# Patient Record
Sex: MALE | Race: WHITE | Employment: FULL TIME | URBAN - NONMETROPOLITAN AREA
[De-identification: names, ages, dates, MRNs, and addresses within clinical notes are randomized per-mention and may not be internally consistent; named-entity substitution may affect disease eponyms.]

---

## 2020-06-01 ENCOUNTER — HOSPITAL ENCOUNTER (EMERGENCY)
Age: 30
Discharge: HOME | End: 2020-06-01
Attending: EMERGENCY MEDICINE
Payer: COMMERCIAL

## 2020-06-01 ENCOUNTER — APPOINTMENT (OUTPATIENT)
Dept: GENERAL RADIOLOGY | Age: 30
End: 2020-06-01
Payer: COMMERCIAL

## 2020-06-01 VITALS
BODY MASS INDEX: 25.67 KG/M2 | OXYGEN SATURATION: 100 % | HEIGHT: 74 IN | DIASTOLIC BLOOD PRESSURE: 67 MMHG | RESPIRATION RATE: 14 BRPM | WEIGHT: 200 LBS | TEMPERATURE: 97.6 F | HEART RATE: 60 BPM | SYSTOLIC BLOOD PRESSURE: 122 MMHG

## 2020-06-01 PROCEDURE — 90715 TDAP VACCINE 7 YRS/> IM: CPT | Performed by: EMERGENCY MEDICINE

## 2020-06-01 PROCEDURE — 12002 RPR S/N/AX/GEN/TRNK2.6-7.5CM: CPT

## 2020-06-01 PROCEDURE — 99283 EMERGENCY DEPT VISIT LOW MDM: CPT

## 2020-06-01 PROCEDURE — 73140 X-RAY EXAM OF FINGER(S): CPT

## 2020-06-01 PROCEDURE — 11730 AVULSION NAIL PLATE SIMPLE 1: CPT

## 2020-06-01 PROCEDURE — 6370000000 HC RX 637 (ALT 250 FOR IP): Performed by: PHYSICIAN ASSISTANT

## 2020-06-01 PROCEDURE — 6370000000 HC RX 637 (ALT 250 FOR IP): Performed by: EMERGENCY MEDICINE

## 2020-06-01 PROCEDURE — 90471 IMMUNIZATION ADMIN: CPT | Performed by: EMERGENCY MEDICINE

## 2020-06-01 PROCEDURE — 6360000002 HC RX W HCPCS: Performed by: EMERGENCY MEDICINE

## 2020-06-01 RX ORDER — CEPHALEXIN 500 MG/1
500 CAPSULE ORAL ONCE
Status: COMPLETED | OUTPATIENT
Start: 2020-06-01 | End: 2020-06-01

## 2020-06-01 RX ORDER — CEPHALEXIN 500 MG/1
500 CAPSULE ORAL 4 TIMES DAILY
Qty: 40 CAPSULE | Refills: 0 | Status: SHIPPED | OUTPATIENT
Start: 2020-06-01 | End: 2020-06-11

## 2020-06-01 RX ORDER — ALBUTEROL SULFATE 90 UG/1
2 AEROSOL, METERED RESPIRATORY (INHALATION) EVERY 6 HOURS PRN
COMMUNITY

## 2020-06-01 RX ORDER — HYDROCODONE BITARTRATE AND ACETAMINOPHEN 5; 325 MG/1; MG/1
1 TABLET ORAL ONCE
Status: COMPLETED | OUTPATIENT
Start: 2020-06-01 | End: 2020-06-01

## 2020-06-01 RX ORDER — NICOTINE POLACRILEX 4 MG/1
20 GUM, CHEWING ORAL DAILY
COMMUNITY

## 2020-06-01 RX ORDER — IBUPROFEN 600 MG/1
600 TABLET ORAL ONCE
Status: COMPLETED | OUTPATIENT
Start: 2020-06-01 | End: 2020-06-01

## 2020-06-01 RX ORDER — HYDROCODONE BITARTRATE AND ACETAMINOPHEN 5; 325 MG/1; MG/1
1 TABLET ORAL EVERY 6 HOURS PRN
Qty: 8 TABLET | Refills: 0 | Status: SHIPPED | OUTPATIENT
Start: 2020-06-01 | End: 2020-06-03

## 2020-06-01 RX ORDER — IBUPROFEN 800 MG/1
800 TABLET ORAL EVERY 8 HOURS PRN
Qty: 20 TABLET | Refills: 0 | Status: SHIPPED | OUTPATIENT
Start: 2020-06-01

## 2020-06-01 RX ADMIN — TETANUS TOXOID, REDUCED DIPHTHERIA TOXOID AND ACELLULAR PERTUSSIS VACCINE, ADSORBED 0.5 ML: 5; 2.5; 8; 8; 2.5 SUSPENSION INTRAMUSCULAR at 19:56

## 2020-06-01 RX ADMIN — CEPHALEXIN 500 MG: 500 CAPSULE ORAL at 19:56

## 2020-06-01 RX ADMIN — HYDROCODONE BITARTRATE AND ACETAMINOPHEN 1 TABLET: 5; 325 TABLET ORAL at 21:07

## 2020-06-01 RX ADMIN — IBUPROFEN 600 MG: 600 TABLET, FILM COATED ORAL at 21:07

## 2020-06-01 ASSESSMENT — PAIN DESCRIPTION - ONSET: ONSET: SUDDEN

## 2020-06-01 ASSESSMENT — PAIN DESCRIPTION - DESCRIPTORS: DESCRIPTORS: THROBBING

## 2020-06-01 ASSESSMENT — PAIN SCALES - GENERAL
PAINLEVEL_OUTOF10: 10
PAINLEVEL_OUTOF10: 2
PAINLEVEL_OUTOF10: 8

## 2020-06-01 ASSESSMENT — PAIN DESCRIPTION - LOCATION: LOCATION: FINGER (COMMENT WHICH ONE)

## 2020-06-01 ASSESSMENT — PAIN DESCRIPTION - ORIENTATION: ORIENTATION: RIGHT

## 2020-06-01 ASSESSMENT — PAIN DESCRIPTION - FREQUENCY: FREQUENCY: CONTINUOUS

## 2020-06-01 ASSESSMENT — PAIN DESCRIPTION - PAIN TYPE: TYPE: ACUTE PAIN

## 2020-06-01 NOTE — ED PROVIDER NOTES
Emergency Department Provider Note  Location: [address] EMERGENCY DEPARTMENT  6/1/2020     Patient Identification  Antonella Banegas is a 30 y.o. male    Chief Complaint  Hand Injury (pt states he dropped a weight bench on his R thumb, bleeding, laceration, crush injury )          HPI  (History provided by patient)  Patient is a generally healthy 25-year-old male who presents for evaluation of her right thumb crush injury that occurred while he was moving free weights. Reports his thumb got caught between 2 bar. Across the distal phalanx. There is clear laceration through the nail. Denies any other injuries. Not on blood thinners or anticoagulants. Unclear when his last tetanus was. I have reviewed the following nursing documentation:  Allergies: No Known Allergies    Past medical history:  has a past medical history of Asthma. Past surgical history:  has no past surgical history on file. Home medications:   Prior to Admission medications    Medication Sig Start Date End Date Taking? Authorizing Provider   omeprazole 20 MG EC tablet Take 20 mg by mouth daily   Yes Historical Provider, MD   albuterol sulfate HFA (VENTOLIN HFA) 108 (90 Base) MCG/ACT inhaler Inhale 2 puffs into the lungs every 6 hours as needed for Wheezing   Yes Historical Provider, MD   cephALEXin (KEFLEX) 500 MG capsule Take 1 capsule by mouth 4 times daily for 10 days 6/1/20 6/11/20 Yes Bri Astudillo PA-C   HYDROcodone-acetaminophen (NORCO) 5-325 MG per tablet Take 1 tablet by mouth every 6 hours as needed for Pain for up to 2 days. 6/1/20 6/3/20 Yes Bri Astudillo PA-C   ibuprofen (IBU) 800 MG tablet Take 1 tablet by mouth every 8 hours as needed for Pain 6/1/20  Yes Bri Astudillo PA-C       Social history:  reports that he has never smoked. He has never used smokeless tobacco. He reports current alcohol use. He reports that he does not use drugs. Family history:  History reviewed.  No pertinent family Mental Status: He is alert and oriented to person, place, and time. Motor: No abnormal muscle tone. Coordination: Coordination normal.   Psychiatric:         Mood and Affect: Mood normal.         Behavior: Behavior normal.             ED Course    ED Medication Orders (From admission, onward)    Start Ordered     Status Ordering Provider    06/01/20 2100 06/01/20 2043  HYDROcodone-acetaminophen (NORCO) 5-325 MG per tablet 1 tablet  ONCE      Last MAR action:  Given - by Lissa Dies on 06/01/20 at [address], [address]    06/01/20 2100 06/01/20 2043  ibuprofen (ADVIL;MOTRIN) tablet 600 mg  ONCE      Last MAR action:  Given - by Lissa Dies on 06/01/20 at [address], [address]    06/01/20 2015 06/01/20 1951  cephALEXin (KEFLEX) capsule 500 mg  ONCE      Last MAR action:  Given - by Lissa Dies on 06/01/20 at [address], [address] [address]    06/01/20 2000 06/01/20 1943  Tetanus-Diphth-Acell Pertussis (BOOSTRIX) injection 0.5 mL  ONCE      Last MAR action:  Given - by Lissa Dies on 06/01/20 at [address], [address] [address]              Radiology  Xr Finger Right (min 2 Views)    Result Date: 6/1/2020  EXAMINATION: THREE XRAY VIEWS OF THE RIGHT FINGERS 6/1/2020 8:11 pm COMPARISON: None. HISTORY: ORDERING SYSTEM PROVIDED HISTORY: R thumb, crush injury TECHNOLOGIST PROVIDED HISTORY: Reason for exam:->R thumb, crush injury Reason for Exam: Hand Injury (pt states he dropped a weight bench on his R thumb, bleeding, laceration, crush injury ) Acuity: Acute Type of Exam: Initial FINDINGS: Soft tissue swelling and defect of distal aspect of the right thumb is. There is a defect of the base of the nail, probably involving its bed. There is suspected fracture of the distal aspect of the tuft of the right thumb. Soft tissue swelling and defect of the distal aspect of the right thumb. Small fracture of the distal aspect of the tuft of the distal phalanx of the right thumb suspected.

## 2020-06-01 NOTE — ED NOTES
Pt to ER via self, pt states he dropped a weight bench on his R thumb, bleeding, laceration, crush injury. Pt writhing in pain, R  Thumb pink and warm, swollen, bleeding controlled. Pt alert and oriented and without distress, call light within reach, denies needs at this time.      Dorinda Henry RN  06/01/20 2013

## 2020-06-02 ASSESSMENT — ENCOUNTER SYMPTOMS
PHOTOPHOBIA: 0
ABDOMINAL PAIN: 0
NAUSEA: 0
DIARRHEA: 0
BACK PAIN: 0
SHORTNESS OF BREATH: 0
VOMITING: 0
RHINORRHEA: 0
WHEEZING: 0
COUGH: 0

## 2020-06-02 NOTE — ED NOTES
Pt DC home in good condition with RX x__3__ with follow up with [facility]. V/u of Dc instructions. Denies questions or concerns. Teaching done re: s/s to report.      Mica Davis RN  06/01/20 4774

## 2020-06-02 NOTE — ED PROVIDER NOTES
immediate complications  COMPLETE NAIL WAS REMOVED. WOUND CLEANED AND IRRIGATED. NAILBED REPAIRED WITH VICRYL SUTURES. WOUND DRESSING AND SPLINT WILL BE APPLIED. Tetanus updated. Placed on keflex. Referred to orthopedic hand specialist for follow up. Advised returning to ER for any worsening or signs of infection. I estimate there is LOW risk for COMPARTMENT SYNDROME, TENDON OR NEUROVASCULAR INJURY, thus I consider the discharge disposition reasonable.            Bri Astudillo PA-C  06/01/20 7982

## 2020-06-02 NOTE — ED NOTES
RN rounded on pt. Pt medicated with tetanus shot and with abx and updated on POC. Pt has no further needs at this time. Pt resting in bed, call light within reach. Pt denies any questions.       Kim Mitchell RN  06/01/20 2010

## 2020-06-02 NOTE — ED NOTES
RN rounded on pt. Pt medicated for pain, updated on POC, vss and as charted. Pt has no further needs at this time. Pt resting in bed, call light within reach. Pt denies any questions.       Margot Carlson RN  06/01/20 0704

## 2020-06-08 ENCOUNTER — OFFICE VISIT (OUTPATIENT)
Dept: ORTHOPEDIC SURGERY | Age: 30
End: 2020-06-08
Payer: COMMERCIAL

## 2020-06-08 VITALS — HEIGHT: 74 IN | BODY MASS INDEX: 26.05 KG/M2 | WEIGHT: 203 LBS

## 2020-06-08 PROBLEM — S61.111A: Status: ACTIVE | Noted: 2020-06-08

## 2020-06-08 PROBLEM — S62.521A: Status: ACTIVE | Noted: 2020-06-08

## 2020-06-08 PROCEDURE — G8419 CALC BMI OUT NRM PARAM NOF/U: HCPCS | Performed by: ORTHOPAEDIC SURGERY

## 2020-06-08 PROCEDURE — G8427 DOCREV CUR MEDS BY ELIG CLIN: HCPCS | Performed by: ORTHOPAEDIC SURGERY

## 2020-06-08 PROCEDURE — 99202 OFFICE O/P NEW SF 15 MIN: CPT | Performed by: ORTHOPAEDIC SURGERY

## 2020-06-08 PROCEDURE — 1036F TOBACCO NON-USER: CPT | Performed by: ORTHOPAEDIC SURGERY

## 2020-06-08 NOTE — PROGRESS NOTES
file     Inability: Not on file    Transportation needs     Medical: Not on file     Non-medical: Not on file   Tobacco Use    Smoking status: Never Smoker    Smokeless tobacco: Never Used   Substance and Sexual Activity    Alcohol use: Yes    Drug use: No    Sexual activity: Yes     Partners: Female   Lifestyle    Physical activity     Days per week: Not on file     Minutes per session: Not on file    Stress: Not on file   Relationships    Social connections     Talks on phone: Not on file     Gets together: Not on file     Attends Rastafari service: Not on file     Active member of club or organization: Not on file     Attends meetings of clubs or organizations: Not on file     Relationship status: Not on file    Intimate partner violence     Fear of current or ex partner: Not on file     Emotionally abused: Not on file     Physically abused: Not on file     Forced sexual activity: Not on file   Other Topics Concern    Not on file   Social History Narrative    Not on file     No family history on file. Patient's medications, allergies, past medical, surgical, social and family histories were reviewed and updated as appropriate. Review of Systems  Pertinent items are noted in HPI  Denies fever chills, confusion and bowel and bladder active change  Complete Review of Systems reviewed from patient history form dated 6/8/20 and available in the patients chart under the media tab. Vital Signs  Vitals:    06/08/20 1402   Weight: 203 lb (92.1 kg)   Height: 6' 1.5\" (1.867 m)     Body mass index is 26.42 kg/m².      Physical Exam  Constitutional: Normal nutritional status  Mental Status: Alert and oriented  Skin: No rashes or erythema  Lymphatic: No lymphadenopathy    Right Hand Examination:  Inspection: He has completely lost the thumbnail on the right thumb there is an area of dried blood on the ulnar aspect so it is difficult to determine exactly the viability of his whole nail bed  Finger Range of Motion: Full  Wrist Range of Motion: Normal  Vascular Exam: Normal capillary refill  Neurologic Exam: No numbness or tingling  Intrinsic Muscle Strength: Normal  Extrinsic Muscle Strength: Normal  Special Tests:        Additional Comments:     Additional Examinations:  X-Ray Findings: PA lateral and oblique x-rays of the right thumb were obtained at the emergency room and on 1 June and I reviewed these there is a small fragment tuft injury. On these radiographs the nail was still present above the eponychial fold  Additional Diagnostic Test Findings:    Office Procedures: Was cleaned and redressed today          This dictation was performed with a verbal recognition program. It is possible that there are still dictated errors within this office note. All efforts were made to ensure that this office note is accurate. No orders of the defined types were placed in this encounter. Attestation: I have reviewed the chief complaint and history of present illness (including ROS and PFSH) and vital documentation by my staff and I agree with their documentation and have added where applicable.

## 2020-06-09 ENCOUNTER — TELEPHONE (OUTPATIENT)
Dept: ORTHOPEDIC SURGERY | Age: 30
End: 2020-06-09

## 2021-01-08 ENCOUNTER — HOSPITAL ENCOUNTER (EMERGENCY)
Age: 31
Discharge: HOME | End: 2021-01-08
Attending: EMERGENCY MEDICINE
Payer: COMMERCIAL

## 2021-01-08 VITALS
RESPIRATION RATE: 18 BRPM | TEMPERATURE: 98 F | OXYGEN SATURATION: 99 % | HEIGHT: 74 IN | DIASTOLIC BLOOD PRESSURE: 85 MMHG | HEART RATE: 91 BPM | BODY MASS INDEX: 25.67 KG/M2 | WEIGHT: 200 LBS | SYSTOLIC BLOOD PRESSURE: 140 MMHG

## 2021-01-08 DIAGNOSIS — J02.0 STREP PHARYNGITIS: Primary | ICD-10-CM

## 2021-01-08 LAB — S PYO AG THROAT QL: NEGATIVE

## 2021-01-08 PROCEDURE — 87081 CULTURE SCREEN ONLY: CPT

## 2021-01-08 PROCEDURE — 99283 EMERGENCY DEPT VISIT LOW MDM: CPT

## 2021-01-08 PROCEDURE — 87880 STREP A ASSAY W/OPTIC: CPT

## 2021-01-08 RX ORDER — AMOXICILLIN 875 MG/1
875 TABLET, COATED ORAL 2 TIMES DAILY
Qty: 20 TABLET | Refills: 0 | Status: SHIPPED | OUTPATIENT
Start: 2021-01-08 | End: 2021-01-18

## 2021-01-08 ASSESSMENT — ENCOUNTER SYMPTOMS
ABDOMINAL PAIN: 0
VOMITING: 0
SORE THROAT: 1
NAUSEA: 0
COUGH: 0
SHORTNESS OF BREATH: 0
DIARRHEA: 1
RHINORRHEA: 0
BACK PAIN: 0

## 2021-01-08 ASSESSMENT — PAIN SCALES - GENERAL: PAINLEVEL_OUTOF10: 5

## 2021-01-08 NOTE — ED PROVIDER NOTES
[facility]        Pt Name: Hannah Tse  MRN: [id number]  [place] 1990  Date of evaluation: 1/8/2021  Provider: Isidra Farris MD  PCP: WINNIE Waite - CNP  ED Attending: MD Vesta Carroll       Chief Complaint   Patient presents with    Pharyngitis      Pt arrives for eval of sore throat onset 2 days ago. Pt sts febrile yesterday- afebrile in triage with no antiemetic since 0200 am. Pt sts wife has strep throat at this time. HISTORY OF PRESENT ILLNESS   (Location/Symptom, Timing/Onset, Context/Setting, Quality, Duration, Modifying Factors, Severity)  Note limiting factors. Hannah Tse is a 35 y.o. male who presents with two to three days of sore throat. Feels scratchy and hurts when he takes a drink. Patient apparently has had a fever intermittently for the last couple of days along with body aches. He denies any runny nose, cough, shortness of breath. Patient states his wife earlier this week tested positive for strep and he believes he has gotten it. Patient took a couple of tablets of amoxicillin yesterday prior to coming in today. History is obtained from the patient. REVIEW OF SYSTEMS    (2-9 systems for level 4, 10 or more for level 5)     Review of Systems   Constitutional: Negative for chills and fever. HENT: Positive for sore throat. Negative for congestion and rhinorrhea. Respiratory: Negative for cough and shortness of breath. Cardiovascular: Negative for chest pain. Gastrointestinal: Positive for diarrhea. Negative for abdominal pain, nausea and vomiting. Musculoskeletal: Negative for back pain and neck pain. Neurological: Negative for weakness. Positives and Pertinent negatives as per HPI. Except as noted above in the ROS, all other systems were reviewed and negative.        PAST MEDICAL HISTORY     Past Medical History:   Diagnosis Date    Asthma SURGICAL HISTORY   No past surgical history on file. [address]       Discharge Medication List as of 1/8/2021 12:15 PM      CONTINUE these medications which have NOT CHANGED    Details   omeprazole 20 MG EC tablet Take 20 mg by mouth dailyHistorical Med      albuterol sulfate HFA (VENTOLIN HFA) 108 (90 Base) MCG/ACT inhaler Inhale 2 puffs into the lungs every 6 hours as needed for WheezingHistorical Med      ibuprofen (IBU) 800 MG tablet Take 1 tablet by mouth every 8 hours as needed for Pain, Disp-20 tablet, R-0Print               ALLERGIES     Patient has no known allergies. FAMILYHISTORY     No family history on file. SOCIAL HISTORY       Social History     Socioeconomic History    Marital status:      Spouse name: Not on file    Number of children: Not on file    Years of education: Not on file    Highest education level: Not on file   Occupational History    Not on file   Social Needs    Financial resource strain: Not on file    Food insecurity     Worry: Not on file     Inability: Not on file    Transportation needs     Medical: Not on file     Non-medical: Not on file   Tobacco Use    Smoking status: Never Smoker    Smokeless tobacco: Never Used   Substance and Sexual Activity    Alcohol use:  Yes    Drug use: No    Sexual activity: Yes     Partners: Female   Lifestyle    Physical activity     Days per week: Not on file     Minutes per session: Not on file    Stress: Not on file   Relationships    Social connections     Talks on phone: Not on file     Gets together: Not on file     Attends Scientologist service: Not on file     Active member of club or organization: Not on file     Attends meetings of clubs or organizations: Not on file     Relationship status: Not on file    Intimate partner violence     Fear of current or ex partner: Not on file     Emotionally abused: Not on file     Physically abused: Not on file     Forced sexual activity: Not on file refill takes less than 2 seconds. Findings: No rash. Neurological:      Mental Status: He is alert and oriented to person, place, and time. DIAGNOSTIC RESULTS   LABS:    Results for orders placed or performed during the hospital encounter of 01/08/21   Strep screen group a throat    Specimen: Throat   Result Value Ref Range    Rapid Strep A Screen Negative Negative       All other labs were within normal range ornot returned as of this dictation. EKG: All EKG's are interpreted by the Emergency Department Physician who either signs or Co-signs this chart in the absence of a cardiologist.  Please see their note for interpretation of EKG. RADIOLOGY:   Non-plain film images such as CT, Ultrasound and MRI are read by the radiologist.  Plain radiographic images are visualized and preliminarily interpreted by the ED Provider with the belowfindings:    Interpretation per the Radiologist below, if available at the time of this note:    No orders to display         PROCEDURES   Unless otherwise noted below, none     Procedures    CRITICAL CARE TIME   N/A    CONSULTS:  None      EMERGENCY DEPARTMENT COURSE and DIFFERENTIAL DIAGNOSIS/MDM:   Vitals:    Vitals:    01/08/21 1132   BP: (!) 140/85   Pulse: 91   Resp: 18   Temp: 98 °F (36.7 °C)   TempSrc: Oral   SpO2: 99%   Weight: 200 lb (90.7 kg)   Height: 6' 2\" (1.88 m)       Patient was given the following medications:  Medications - No data to display    Patient's rapid strep is likely going to be negative given his recent use of amoxicillin. Clinically he appears to have strep pharyngitis, especially given the history of his wifes infection. I am going to formally put him on amoxicillin. I want him to follow up with his PCP sometime this week. Patient is agreeable with this plan.     I estimate there is LOW risk for EPIGLOTTITIS, GONOCOCCAL PHARYNGITIS, RPA, PTA, LUDWIGS ANGINA, PNEUMONIA, MENINGITIS, OR URINARY TRACT INFECTION, thus I consider the discharge disposition reasonable. Also, there is no evidence or peritonitis, sepsis, or toxicity. Lorna Girard and I have discussed the diagnosis and risks, and we agree with discharging home to follow-up with their primary doctor. We also discussed returning to the Emergency Department immediately if new or worsening symptoms occur. We have discussed the symptoms which are most concerning (e.g., changing or worsening pain, trouble swallowing or breating, neck stiffness, fever) that necessitate immediate return. Discharge Vital Signs:  Blood pressure (!) 140/85, pulse 91, temperature 98 °F (36.7 °C), temperature source Oral, resp. rate 18, height 6' 2\" (1.88 m), weight 200 lb (90.7 kg), SpO2 99 %. The patient understands the importance of follow up and reasons to return. FINAL IMPRESSION      1. Strep pharyngitis          DISPOSITION/PLAN   DISPOSITION Decision To Discharge 01/08/2021 12:13:35 PM      PATIENT REFERRED TO:  WINNIE Licona - CNP    Schedule an appointment as soon as possible for a visit in 1 week      [address] Emergency Department  Kurtis Rivera [address]  [phone]  Go to   If symptoms worsen      DISCHARGE MEDICATIONS:  Discharge Medication List as of 1/8/2021 12:15 PM      START taking these medications    Details   amoxicillin (AMOXIL) 875 MG tablet Take 1 tablet by mouth 2 times daily for 10 days, Disp-20 tablet, R-0Normal             DISCONTINUED MEDICATIONS:  Discharge Medication List as of 1/8/2021 12:15 PM                 (Please note that portions of this note were completed with a voice recognition program.  Efforts were made to edit the dictations but occasionally words are mis-transcribed.)    Mateo Burr MD(electronically signed)              Mateo Burr MD  01/08/21 4520

## 2021-01-10 LAB — S PYO THROAT QL CULT: NORMAL

## 2023-02-08 ENCOUNTER — INITIAL CONSULT (OUTPATIENT)
Dept: SURGERY | Age: 33
End: 2023-02-08

## 2023-02-08 VITALS — WEIGHT: 200 LBS | HEIGHT: 74 IN | BODY MASS INDEX: 25.67 KG/M2

## 2023-02-08 DIAGNOSIS — K43.6 INCARCERATED VENTRAL HERNIA: Primary | ICD-10-CM

## 2023-02-08 DIAGNOSIS — R10.13 EPIGASTRIC PAIN: ICD-10-CM

## 2023-02-15 ENCOUNTER — TELEPHONE (OUTPATIENT)
Dept: SURGERY | Age: 33
End: 2023-02-15

## 2023-02-15 ENCOUNTER — HOSPITAL ENCOUNTER (OUTPATIENT)
Dept: ULTRASOUND IMAGING | Age: 33
Discharge: HOME | End: 2023-02-15
Payer: COMMERCIAL

## 2023-02-15 ENCOUNTER — HOSPITAL ENCOUNTER (OUTPATIENT)
Dept: GENERAL RADIOLOGY | Age: 33
Discharge: HOME | End: 2023-02-15
Payer: COMMERCIAL

## 2023-02-15 DIAGNOSIS — R10.13 EPIGASTRIC PAIN: ICD-10-CM

## 2023-02-15 PROCEDURE — 74240 X-RAY XM UPR GI TRC 1CNTRST: CPT

## 2023-02-15 PROCEDURE — 76705 ECHO EXAM OF ABDOMEN: CPT

## 2023-02-15 NOTE — TELEPHONE ENCOUNTER
Pt called stating he had his testing done this morning, wasn't sure if he was to schedule another office appt or wait for results for surgery scheduling.

## 2023-02-15 NOTE — TELEPHONE ENCOUNTER
Tell him UGI and GBUS were normal.  Ok to proceed with fixing ventral hernia. We can set that up or he can come back in to the office to discuss further. If he wants to proceed then lap ventral hernia repair with mesh. 45 minutes. General. Dx:  incarcerated ventral hernia.

## 2023-02-16 NOTE — TELEPHONE ENCOUNTER
I called patient and informed him. He states he wants this to go under [facility], but we do not have that info on file. He states his PCP referred him to our office, so he will contact them.

## 2023-02-20 NOTE — PROGRESS NOTES
[place]          HPI:  Patient is 33y.o. year old male seen at request of Dejan Olmos. He   reports bulge and pain in umbilical area. He also has pain in RUQ. It is pressure-like and sharp. It is described as moderate. Other associated symptoms are bloating/abdominal distension and nausea. These symptoms have been present for  weeks . The pain seems to have started with an unknown event. The pain does radiate to the back. He   admits to seeing a bulge at the umbilicus. Past Medical History:   Diagnosis Date    Asthma        No past surgical history on file. Current Outpatient Medications on File Prior to Visit   Medication Sig Dispense Refill    albuterol sulfate HFA (PROVENTIL;VENTOLIN;PROAIR) 108 (90 Base) MCG/ACT inhaler Inhale 2 puffs into the lungs every 6 hours as needed for Wheezing Indications: PRN      omeprazole 20 MG EC tablet Take 20 mg by mouth daily (Patient not taking: Reported on 2/8/2023)      ibuprofen (IBU) 800 MG tablet Take 1 tablet by mouth every 8 hours as needed for Pain (Patient not taking: Reported on 2/8/2023) 20 tablet 0     No current facility-administered medications on file prior to visit.        No Known Allergies    Social History     Socioeconomic History    Marital status:      Spouse name: Not on file    Number of children: Not on file    Years of education: Not on file    Highest education level: Not on file   Occupational History    Not on file   Tobacco Use    Smoking status: Never    Smokeless tobacco: Never   Vaping Use    Vaping Use: Never used   Substance and Sexual Activity    Alcohol use: Not Currently    Drug use: No    Sexual activity: Yes     Partners: Female   Other Topics Concern    Not on file   Social History Narrative    Not on file     Social Determinants of Health     Financial Resource Strain: Not on file   Food Insecurity: Not on file   Transportation Needs: Not on file   Physical Activity: Not on file   Stress: Not on file   Social Connections: Not on file   Intimate Partner Violence: Not on file   Housing Stability: Not on file       No family history on file. ROS: He reports no complaints related to the eyes, ears , nose throat or mouth. He denies weight loss. No chest pain. No SOB. No urinary complaints. No musculoskeletal complaints. No skin rashes. No neurologic deficits. No bleeding tendencies. GI complaints include periumbilical pain and bulge as well as RUQ pain. Physical Exam:    General:  Comfortable. No distress. Eyes:  No scleral icterus  Ears:  Normal  Nose:  Normal  Mouth:  Mucous membranes moist  Respiratory: Lungs CTA. No accessory muscle use. Heart:  Regular rhythm  Abdomen:  Soft. Non distended. Mild tenderness RUQ. Ventral hernia present. Musculoskeletal:  No abnormal movements. ROM extremities normal.  Skin:  No rashes. Neurologic:  No focal deficits. Psychiatric:  AAA. O x 3.    Radiographic studies:  None        ASSESSMENT:  1. Incarcerated ventral hernia    2. Epigastric pain            PLAN:  UGI and GBUS to further evaluate the epigastric and RUQ pain. Ventral hernia repair will be recommended. Will need gallbladder workup completed prior to considering ventral hernia surgery.       Freddie Adler MD

## 2023-02-23 ENCOUNTER — TELEPHONE (OUTPATIENT)
Dept: SURGERY | Age: 33
End: 2023-02-23

## 2023-03-08 ENCOUNTER — TELEPHONE (OUTPATIENT)
Dept: SURGERY | Age: 33
End: 2023-03-08

## 2023-03-09 DIAGNOSIS — K43.6 INCARCERATED VENTRAL HERNIA: ICD-10-CM

## 2023-03-09 NOTE — TELEPHONE ENCOUNTER
I called patient and he scheduled surgery for 3/20/23. He was given all presurgery instructions, no aspirin, blood thinners 5 days preop, NPO after midnight prior, He must have someone derive him home. He was given directions to [facility] to sign in.

## 2023-03-17 ENCOUNTER — ANESTHESIA EVENT (OUTPATIENT)
Dept: OPERATING ROOM | Age: 33
End: 2023-03-17
Payer: COMMERCIAL

## 2023-03-20 ENCOUNTER — ANESTHESIA (OUTPATIENT)
Dept: OPERATING ROOM | Age: 33
End: 2023-03-20
Payer: COMMERCIAL

## 2023-03-20 ENCOUNTER — HOSPITAL ENCOUNTER (OUTPATIENT)
Age: 33
Setting detail: OUTPATIENT SURGERY
Discharge: HOME | End: 2023-03-20
Attending: SURGERY | Admitting: SURGERY
Payer: COMMERCIAL

## 2023-03-20 VITALS
TEMPERATURE: 97.7 F | WEIGHT: 199 LBS | OXYGEN SATURATION: 96 % | SYSTOLIC BLOOD PRESSURE: 123 MMHG | BODY MASS INDEX: 25.54 KG/M2 | RESPIRATION RATE: 16 BRPM | HEART RATE: 68 BPM | HEIGHT: 74 IN | DIASTOLIC BLOOD PRESSURE: 62 MMHG

## 2023-03-20 DIAGNOSIS — K43.6 INCARCERATED VENTRAL HERNIA: Primary | ICD-10-CM

## 2023-03-20 PROCEDURE — 7100000001 HC PACU RECOVERY - ADDTL 15 MIN: Performed by: SURGERY

## 2023-03-20 PROCEDURE — 2500000003 HC RX 250 WO HCPCS: Performed by: SURGERY

## 2023-03-20 PROCEDURE — 6360000002 HC RX W HCPCS: Performed by: ANESTHESIOLOGY

## 2023-03-20 PROCEDURE — 2709999900 HC NON-CHARGEABLE SUPPLY: Performed by: SURGERY

## 2023-03-20 PROCEDURE — 3600000014 HC SURGERY LEVEL 4 ADDTL 15MIN: Performed by: SURGERY

## 2023-03-20 PROCEDURE — 2500000003 HC RX 250 WO HCPCS: Performed by: ANESTHESIOLOGY

## 2023-03-20 PROCEDURE — 6360000002 HC RX W HCPCS: Performed by: NURSE ANESTHETIST, CERTIFIED REGISTERED

## 2023-03-20 PROCEDURE — 2500000003 HC RX 250 WO HCPCS: Performed by: NURSE ANESTHETIST, CERTIFIED REGISTERED

## 2023-03-20 PROCEDURE — 7100000010 HC PHASE II RECOVERY - FIRST 15 MIN: Performed by: SURGERY

## 2023-03-20 PROCEDURE — 49592 RPR AA HRN 1ST < 3 NCR/STRN: CPT | Performed by: SURGERY

## 2023-03-20 PROCEDURE — C1713 ANCHOR/SCREW BN/BN,TIS/BN: HCPCS | Performed by: SURGERY

## 2023-03-20 PROCEDURE — 7100000011 HC PHASE II RECOVERY - ADDTL 15 MIN: Performed by: SURGERY

## 2023-03-20 PROCEDURE — 7100000000 HC PACU RECOVERY - FIRST 15 MIN: Performed by: SURGERY

## 2023-03-20 PROCEDURE — 6360000002 HC RX W HCPCS

## 2023-03-20 PROCEDURE — 3700000000 HC ANESTHESIA ATTENDED CARE: Performed by: SURGERY

## 2023-03-20 PROCEDURE — 2580000003 HC RX 258: Performed by: SURGERY

## 2023-03-20 PROCEDURE — C1781 MESH (IMPLANTABLE): HCPCS | Performed by: SURGERY

## 2023-03-20 PROCEDURE — 6360000002 HC RX W HCPCS: Performed by: SURGERY

## 2023-03-20 PROCEDURE — 3700000001 HC ADD 15 MINUTES (ANESTHESIA): Performed by: SURGERY

## 2023-03-20 PROCEDURE — 3600000004 HC SURGERY LEVEL 4 BASE: Performed by: SURGERY

## 2023-03-20 PROCEDURE — 2580000003 HC RX 258: Performed by: ANESTHESIOLOGY

## 2023-03-20 PROCEDURE — 6370000000 HC RX 637 (ALT 250 FOR IP): Performed by: ANESTHESIOLOGY

## 2023-03-20 DEVICE — MESH HERN DIA4.5IN CIR W/ ECHO PS POS SYS VENTRALIGHT ST: Type: IMPLANTABLE DEVICE | Site: ABDOMEN | Status: FUNCTIONAL

## 2023-03-20 DEVICE — SYSTEM PERM FIX L37CM 15 FAST CAPSUR: Type: IMPLANTABLE DEVICE | Site: ABDOMEN | Status: FUNCTIONAL

## 2023-03-20 DEVICE — DEVICE FIX L37CM PEEK SMOOTH CANN 30 ABSRB FAST FOR LAP: Type: IMPLANTABLE DEVICE | Site: ABDOMEN | Status: FUNCTIONAL

## 2023-03-20 RX ORDER — FENTANYL CITRATE 50 UG/ML
INJECTION, SOLUTION INTRAMUSCULAR; INTRAVENOUS PRN
Status: DISCONTINUED | OUTPATIENT
Start: 2023-03-20 | End: 2023-03-20 | Stop reason: SDUPTHER

## 2023-03-20 RX ORDER — ONDANSETRON 2 MG/ML
INJECTION INTRAMUSCULAR; INTRAVENOUS PRN
Status: DISCONTINUED | OUTPATIENT
Start: 2023-03-20 | End: 2023-03-20 | Stop reason: SDUPTHER

## 2023-03-20 RX ORDER — MEPERIDINE HYDROCHLORIDE 25 MG/ML
12.5 INJECTION INTRAMUSCULAR; INTRAVENOUS; SUBCUTANEOUS EVERY 5 MIN PRN
Status: DISCONTINUED | OUTPATIENT
Start: 2023-03-20 | End: 2023-03-20 | Stop reason: HOSPADM

## 2023-03-20 RX ORDER — KETOROLAC TROMETHAMINE 30 MG/ML
30 INJECTION, SOLUTION INTRAMUSCULAR; INTRAVENOUS ONCE
Status: DISCONTINUED | OUTPATIENT
Start: 2023-03-20 | End: 2023-03-20 | Stop reason: HOSPADM

## 2023-03-20 RX ORDER — SODIUM CHLORIDE, SODIUM LACTATE, POTASSIUM CHLORIDE, CALCIUM CHLORIDE 600; 310; 30; 20 MG/100ML; MG/100ML; MG/100ML; MG/100ML
INJECTION, SOLUTION INTRAVENOUS CONTINUOUS
Status: DISCONTINUED | OUTPATIENT
Start: 2023-03-20 | End: 2023-03-20 | Stop reason: HOSPADM

## 2023-03-20 RX ORDER — OXYCODONE HYDROCHLORIDE 5 MG/1
10 TABLET ORAL PRN
Status: COMPLETED | OUTPATIENT
Start: 2023-03-20 | End: 2023-03-20

## 2023-03-20 RX ORDER — ONDANSETRON 2 MG/ML
4 INJECTION INTRAMUSCULAR; INTRAVENOUS
Status: DISCONTINUED | OUTPATIENT
Start: 2023-03-20 | End: 2023-03-20 | Stop reason: HOSPADM

## 2023-03-20 RX ORDER — HEPARIN SODIUM 5000 [USP'U]/ML
5000 INJECTION, SOLUTION INTRAVENOUS; SUBCUTANEOUS ONCE
Status: DISCONTINUED | OUTPATIENT
Start: 2023-03-20 | End: 2023-03-20 | Stop reason: HOSPADM

## 2023-03-20 RX ORDER — SODIUM CHLORIDE 0.9 % (FLUSH) 0.9 %
5-40 SYRINGE (ML) INJECTION EVERY 12 HOURS SCHEDULED
Status: DISCONTINUED | OUTPATIENT
Start: 2023-03-20 | End: 2023-03-20 | Stop reason: HOSPADM

## 2023-03-20 RX ORDER — FAMOTIDINE 10 MG/ML
20 INJECTION, SOLUTION INTRAVENOUS ONCE
Status: COMPLETED | OUTPATIENT
Start: 2023-03-20 | End: 2023-03-20

## 2023-03-20 RX ORDER — DEXAMETHASONE SODIUM PHOSPHATE 4 MG/ML
INJECTION, SOLUTION INTRA-ARTICULAR; INTRALESIONAL; INTRAMUSCULAR; INTRAVENOUS; SOFT TISSUE PRN
Status: DISCONTINUED | OUTPATIENT
Start: 2023-03-20 | End: 2023-03-20 | Stop reason: SDUPTHER

## 2023-03-20 RX ORDER — ROCURONIUM BROMIDE 10 MG/ML
INJECTION, SOLUTION INTRAVENOUS PRN
Status: DISCONTINUED | OUTPATIENT
Start: 2023-03-20 | End: 2023-03-20 | Stop reason: SDUPTHER

## 2023-03-20 RX ORDER — SODIUM CHLORIDE 9 MG/ML
25 INJECTION, SOLUTION INTRAVENOUS PRN
Status: DISCONTINUED | OUTPATIENT
Start: 2023-03-20 | End: 2023-03-20 | Stop reason: HOSPADM

## 2023-03-20 RX ORDER — SODIUM CHLORIDE 0.9 % (FLUSH) 0.9 %
5-40 SYRINGE (ML) INJECTION PRN
Status: DISCONTINUED | OUTPATIENT
Start: 2023-03-20 | End: 2023-03-20 | Stop reason: HOSPADM

## 2023-03-20 RX ORDER — LIDOCAINE HYDROCHLORIDE 10 MG/ML
0.3 INJECTION, SOLUTION EPIDURAL; INFILTRATION; INTRACAUDAL; PERINEURAL
Status: DISCONTINUED | OUTPATIENT
Start: 2023-03-20 | End: 2023-03-20 | Stop reason: HOSPADM

## 2023-03-20 RX ORDER — CEFAZOLIN 2 G/1
INJECTION, POWDER, FOR SOLUTION INTRAMUSCULAR; INTRAVENOUS
Status: DISCONTINUED
Start: 2023-03-20 | End: 2023-03-20 | Stop reason: HOSPADM

## 2023-03-20 RX ORDER — HEPARIN SODIUM 5000 [USP'U]/ML
INJECTION, SOLUTION INTRAVENOUS; SUBCUTANEOUS
Status: COMPLETED
Start: 2023-03-20 | End: 2023-03-20

## 2023-03-20 RX ORDER — PROPOFOL 10 MG/ML
INJECTION, EMULSION INTRAVENOUS PRN
Status: DISCONTINUED | OUTPATIENT
Start: 2023-03-20 | End: 2023-03-20 | Stop reason: SDUPTHER

## 2023-03-20 RX ORDER — OXYCODONE HYDROCHLORIDE 5 MG/1
5 TABLET ORAL EVERY 6 HOURS PRN
Qty: 24 TABLET | Refills: 0 | Status: SHIPPED | OUTPATIENT
Start: 2023-03-20 | End: 2023-03-27

## 2023-03-20 RX ORDER — SODIUM CHLORIDE 9 MG/ML
INJECTION, SOLUTION INTRAVENOUS PRN
Status: DISCONTINUED | OUTPATIENT
Start: 2023-03-20 | End: 2023-03-20 | Stop reason: HOSPADM

## 2023-03-20 RX ORDER — KETOROLAC TROMETHAMINE 30 MG/ML
INJECTION, SOLUTION INTRAMUSCULAR; INTRAVENOUS
Status: COMPLETED
Start: 2023-03-20 | End: 2023-03-20

## 2023-03-20 RX ORDER — BUPIVACAINE HYDROCHLORIDE 5 MG/ML
INJECTION, SOLUTION EPIDURAL; INTRACAUDAL PRN
Status: DISCONTINUED | OUTPATIENT
Start: 2023-03-20 | End: 2023-03-20 | Stop reason: ALTCHOICE

## 2023-03-20 RX ORDER — OXYCODONE HYDROCHLORIDE 5 MG/1
5 TABLET ORAL PRN
Status: COMPLETED | OUTPATIENT
Start: 2023-03-20 | End: 2023-03-20

## 2023-03-20 RX ADMIN — HEPARIN SODIUM 5000 UNITS: 5000 INJECTION, SOLUTION INTRAVENOUS; SUBCUTANEOUS at 08:11

## 2023-03-20 RX ADMIN — HYDROMORPHONE HYDROCHLORIDE 0.5 MG: 1 INJECTION, SOLUTION INTRAMUSCULAR; INTRAVENOUS; SUBCUTANEOUS at 09:42

## 2023-03-20 RX ADMIN — ROCURONIUM BROMIDE 50 MG: 10 INJECTION, SOLUTION INTRAVENOUS at 08:36

## 2023-03-20 RX ADMIN — OXYCODONE 10 MG: 5 TABLET ORAL at 10:11

## 2023-03-20 RX ADMIN — HYDROMORPHONE HYDROCHLORIDE 0.5 MG: 1 INJECTION, SOLUTION INTRAMUSCULAR; INTRAVENOUS; SUBCUTANEOUS at 09:53

## 2023-03-20 RX ADMIN — SODIUM CHLORIDE, POTASSIUM CHLORIDE, SODIUM LACTATE AND CALCIUM CHLORIDE: 600; 310; 30; 20 INJECTION, SOLUTION INTRAVENOUS at 08:15

## 2023-03-20 RX ADMIN — FAMOTIDINE 20 MG: 10 INJECTION, SOLUTION INTRAVENOUS at 08:16

## 2023-03-20 RX ADMIN — CEFAZOLIN 2000 MG: 2 INJECTION, POWDER, FOR SOLUTION INTRAMUSCULAR; INTRAVENOUS at 08:30

## 2023-03-20 RX ADMIN — SUGAMMADEX 200 MG: 100 INJECTION, SOLUTION INTRAVENOUS at 09:09

## 2023-03-20 RX ADMIN — ONDANSETRON HYDROCHLORIDE 4 MG: 2 INJECTION, SOLUTION INTRAMUSCULAR; INTRAVENOUS at 08:47

## 2023-03-20 RX ADMIN — FENTANYL CITRATE 100 MCG: 50 INJECTION INTRAMUSCULAR; INTRAVENOUS at 08:36

## 2023-03-20 RX ADMIN — HYDROMORPHONE HYDROCHLORIDE 0.5 MG: 1 INJECTION, SOLUTION INTRAMUSCULAR; INTRAVENOUS; SUBCUTANEOUS at 09:31

## 2023-03-20 RX ADMIN — PROPOFOL 200 MG: 10 INJECTION, EMULSION INTRAVENOUS at 08:36

## 2023-03-20 RX ADMIN — DEXAMETHASONE SODIUM PHOSPHATE 4 MG: 4 INJECTION, SOLUTION INTRAMUSCULAR; INTRAVENOUS at 08:47

## 2023-03-20 RX ADMIN — FENTANYL CITRATE 50 MCG: 50 INJECTION INTRAMUSCULAR; INTRAVENOUS at 08:46

## 2023-03-20 RX ADMIN — HYDROMORPHONE HYDROCHLORIDE 0.5 MG: 1 INJECTION, SOLUTION INTRAMUSCULAR; INTRAVENOUS; SUBCUTANEOUS at 09:36

## 2023-03-20 RX ADMIN — SODIUM CHLORIDE, POTASSIUM CHLORIDE, SODIUM LACTATE AND CALCIUM CHLORIDE: 600; 310; 30; 20 INJECTION, SOLUTION INTRAVENOUS at 08:18

## 2023-03-20 RX ADMIN — FENTANYL CITRATE 50 MCG: 50 INJECTION INTRAMUSCULAR; INTRAVENOUS at 09:09

## 2023-03-20 RX ADMIN — FENTANYL CITRATE 50 MCG: 50 INJECTION INTRAMUSCULAR; INTRAVENOUS at 09:07

## 2023-03-20 RX ADMIN — KETOROLAC TROMETHAMINE 30 MG: 30 INJECTION, SOLUTION INTRAMUSCULAR; INTRAVENOUS at 09:54

## 2023-03-20 ASSESSMENT — PAIN SCALES - GENERAL
PAINLEVEL_OUTOF10: 0
PAINLEVEL_OUTOF10: 10
PAINLEVEL_OUTOF10: 9
PAINLEVEL_OUTOF10: 8
PAINLEVEL_OUTOF10: 10
PAINLEVEL_OUTOF10: 9
PAINLEVEL_OUTOF10: 0
PAINLEVEL_OUTOF10: 8
PAINLEVEL_OUTOF10: 3
PAINLEVEL_OUTOF10: 9
PAINLEVEL_OUTOF10: 10
PAINLEVEL_OUTOF10: 10
PAINLEVEL_OUTOF10: 0

## 2023-03-20 ASSESSMENT — PAIN DESCRIPTION - LOCATION
LOCATION: ABDOMEN
LOCATION: SCROTUM

## 2023-03-20 ASSESSMENT — PAIN DESCRIPTION - PAIN TYPE
TYPE: SURGICAL PAIN

## 2023-03-20 ASSESSMENT — LIFESTYLE VARIABLES: SMOKING_STATUS: 0

## 2023-03-20 ASSESSMENT — PAIN - FUNCTIONAL ASSESSMENT: PAIN_FUNCTIONAL_ASSESSMENT: 0-10

## 2023-03-20 ASSESSMENT — ENCOUNTER SYMPTOMS: SHORTNESS OF BREATH: 0

## 2023-03-20 NOTE — PROGRESS NOTES
PRE OP INSTRUCTION SHEET   1. Do not eat or drink anything after 12 midnight  prior to surgery. This includes no water, chewing gum or mints. 2. Take the following pills will a small sip of water (see MAR)                                        3. Aspirin, Ibuprofen, Advil, Naproxen, Vitamin E, fish oil and other Anti-inflammatory products should be stopped for 5 days before surgery or as directed by your physician. 4. Check with your Doctor regarding stopping Plavix, Coumadin, Lovenox, Fragmin or other blood thinners   5. Do not smoke, and do not drink any alcoholic beverages 24 hours prior to surgery. This includes NA Beer. 6. You may brush your teeth and gargle the morning of surgery. DO NOT SWALLOW WATER   7. You MUST make arrangements for a responsible adult to take you home after your surgery. You will not be allowed to leave alone or drive yourself home. It is strongly suggested someone stay with you the first 24 hrs. Your surgery will be cancelled if you do not have a ride home. 8. A parent/legal guardian must accompany a child scheduled for surgery and plan to stay at the hospital until the child is discharged. Please do not bring other children with you. 9. Please wear simple, loose fitting clothing to the hospital.  Negrete Carol not bring valuables (money, credit cards, checkbooks, etc.) Do not wear any makeup (including no eye makeup) or nail polish on your fingers or toes. 10. DO NOT wear any jewelry or piercings on day of surgery. All body piercing jewelry must be removed. 11. If you have dentures,glasses, or contacts they will be removed before going to the OR; we will provide you a container. 12. Please see your family doctor/and cardiologist for a history & physical and/or concerning medications. Bring any test results/reports from your physician's office. Have history and labs faxed to [phone].  Remember to bring Blood Bank bracelet on the day of
Pt arrived from OR. Report from [facility] and CRNA. Vital signs stable. Oral airway in place. Will monitor.
Pt discharged to private car with wife. Pain controlled. Pt became nauseated on wheelchair ride to car but did not vomit. Discharged to home in stable condition.
Pt much more comfortable. Discharge instructions given to pt and family. Verbalizes understanding.
Pt states pain is 10/10. Medicated per MAR.
Removed oral airway.
Spoke with patient aware of time change to 0715 arrival on 03/20/2023. Ayad Walters RN
and Phase II process. 23.  Patient pain level is established preoperatively using age appropriate pain scale. 24.  The patient will move to fall risk upon sedation- during and through the recovery phase. Interventions- orient the patient to the environment, especially the location of the bathroom; provide treaded socks/non-skid footwear; demonstrate and teach back use of the nurse's call system; instruct the patient to call for help before getting out of bed; lock all movable equipment before transferring patient; keep bed in lowest position possible.  25.  Other:

## 2023-03-20 NOTE — LETTER
[facility] OR  [address] [place] [id number]  Phone: [phone]             March 20, 2023    Patient: Aaron Florence   YOB: 1990   Date of Visit: 3/20/2023       To Whom It May Concern:    Sumit Johnson was seen and treated in our facility  On  3/20/2023. Please excuse his absence from 3/20/23-4/3/23.     Sincerely,             Signature:__________________________________

## 2023-03-20 NOTE — ANESTHESIA PRE PROCEDURE
03/20/23 0751   BP:  (!) 182/76   Pulse:  84   Resp:  12   Temp:  97.8 °F (36.6 °C)   TempSrc:  Temporal   SpO2:  97%   Weight: 199 lb (90.3 kg) 199 lb (90.3 kg)   Height: 6' 2\" (1.88 m) 6' 2\" (1.88 m)                                              BP Readings from Last 3 Encounters:   03/20/23 (!) 182/76   01/08/21 (!) 140/85   06/01/20 122/67       NPO Status:  MN+, SEE MAR                                                                               BMI:   Wt Readings from Last 3 Encounters:   03/20/23 199 lb (90.3 kg)   02/08/23 200 lb (90.7 kg)   01/08/21 200 lb (90.7 kg)     Body mass index is 25.55 kg/m². CBC: No results found for: WBC, RBC, HGB, HCT, MCV, RDW, PLT    CMP: No results found for: NA, K, CL, CO2, BUN, CREATININE, GFRAA, AGRATIO, LABGLOM, GLUCOSE, GLU, PROT, CALCIUM, BILITOT, ALKPHOS, AST, ALT    POC Tests: No results for input(s): POCGLU, POCNA, POCK, POCCL, POCBUN, POCHEMO, POCHCT in the last 72 hours. Coags: No results found for: PROTIME, INR, APTT    HCG (If Applicable): No results found for: PREGTESTUR, PREGSERUM, HCG, HCGQUANT     ABGs: No results found for: PHART, PO2ART, VMN7WJB, XAZ1QKC, BEART, E8ZKGOPV     Type & Screen (If Applicable):  No results found for: LABABO, LABRH    Drug/Infectious Status (If Applicable):  No results found for: HIV, HEPCAB    COVID-19 Screening (If Applicable): No results found for: COVID19        Anesthesia Evaluation  Patient summary reviewed no history of anesthetic complications:   Airway: Mallampati: II  TM distance: >3 FB   Neck ROM: full  Mouth opening: > = 3 FB   Dental:          Pulmonary: breath sounds clear to auscultation  (+) asthma (RARE IBHALER USE, NO O2 REQ.):     (-) COPD, shortness of breath, recent URI, sleep apnea and not a current smoker          Patient did not smoke on day of surgery.                  Cardiovascular:Negative CV ROS        (-) pacemaker, hypertension, past MI, CAD, CABG/stent, dysrhythmias,  angina and  CHF    ECG

## 2023-03-20 NOTE — H&P
Department of General Surgery - Adult  Surgical Service   Attending History and Physical        CHIEF COMPLAINT:  Ventral hernia      History Obtained From:  patient    HISTORY OF PRESENT ILLNESS:    This patient is a 33 y.o. male who presents with need for hernia repair. Past Medical History:        Diagnosis Date    Asthma      Past Surgical History:    History reviewed. No pertinent surgical history. Current Medications:  Current Facility-Administered Medications   Medication Dose Route Frequency Provider Last Rate Last Admin    lidocaine PF 1 % injection 0.3 mL  0.3 mL IntraDERmal Once PRN Osmin Boyle MD        lactated ringers IV soln infusion   IntraVENous Continuous Osmin Boyle MD        sodium chloride flush 0.9 % injection 5-40 mL  5-40 mL IntraVENous 2 times per day Osmin Boyle MD        sodium chloride flush 0.9 % injection 5-40 mL  5-40 mL IntraVENous PRN Osmin Boyle MD        0.9 % sodium chloride infusion   IntraVENous PRN Osmin Boyle MD        famotidine (PEPCID) injection 20 mg  20 mg IntraVENous Once Yesica Mcclure MD        ceFAZolin (ANCEF) 2,000 mg in sodium chloride 0.9 % 50 mL IVPB (mini-bag)  2,000 mg IntraVENous Once David Nathan MD        heparin (porcine) injection 5,000 Units  5,000 Units SubCUTAneous Once David Nathan MD         Home Medications:  Prior to Admission medications    Medication Sig Start Date End Date Taking? Authorizing Provider   albuterol sulfate HFA (PROVENTIL;VENTOLIN;PROAIR) 108 (90 Base) MCG/ACT inhaler Inhale 2 puffs into the lungs every 6 hours as needed for Wheezing Indications: PRN    Historical MD Cliff     Allergies:  Patient has no known allergies. Social History:   TOBACCO:   reports that he has never smoked. He has never used smokeless tobacco.  ETOH:   reports that he does not currently use alcohol. Family History:   History reviewed. No pertinent family history.   REVIEW OF SYSTEMS:    Patient reports no

## 2023-03-20 NOTE — BRIEF OP NOTE
Brief Postoperative Note      Patient: Gail Moore  YOB: 1990  MRN: [id number]    Date of Procedure: 3/20/2023    Pre-Op Diagnosis: Incarcerated ventral hernia [K43.6]    Post-Op Diagnosis: Same       Procedure(s):  LAPAROSCOPIC VENTRAL HERNIA REPAIR WITH MESH    Surgeon(s):  Vijaya Wakefield MD    Assistant:  Surgical Assistant: Donavon Tan    Anesthesia: General    Estimated Blood Loss (mL): Minimal    Complications: None    Specimens:   * No specimens in log *    Implants:  Implant Name Type Inv. Item Serial No.  Lot No. LRB No. Used Action   MESH JOHNATHON DIA4. 5IN CIR W/ ECHO PS POS SYS VENTRALIGHT ST - [id number]  MESH JOHNATHON DIA4. 5IN CIR W/ ECHO PS POS SYS VENTRALIGHT ST  BARD DAVOL-WD [id number] N/A 1 Implanted   DEVICE FIX L37CM PEEK SMOOTH SOFIE 30 ABSRB FAST FOR LAP - [id number]  DEVICE FIX L37CM PEEK SMOOTH SOFIE 30 ABSRB FAST FOR LAP  BARD DAVOL-WD [id number] N/A 1 Implanted   SYSTEM PERM FIX L37CM 15 FAST CAPSUR - [id number]  SYSTEM PERM FIX L37CM 15 FAST CAPSUR  BARD DAVOL-WD [id number] N/A 1 Implanted         Drains: * No LDAs found *    Findings: As above    Electronically signed by Blane Bender MD on 3/20/2023 at 9:12 AM

## 2023-03-20 NOTE — ANESTHESIA POSTPROCEDURE EVALUATION
Department of Anesthesiology  Postprocedure Note    Patient: Aaron Florence  MRN: [id number]  YOB: 1990  Date of evaluation: 3/20/2023      Procedure Summary     Date: 03/20/23 Room / Location: [facility]    Anesthesia Start: 7468 Anesthesia Stop: 9686    Procedure: LAPAROSCOPIC VENTRAL HERNIA REPAIR WITH MESH (Abdomen) Diagnosis:       Incarcerated ventral hernia      (Incarcerated ventral hernia [K43.6])    Surgeons: Osman Jimenez MD Responsible Provider: Ioana Kay MD    Anesthesia Type: general ASA Status: 2          Anesthesia Type: No value filed.     Zachary Phase I: Zachary Score: 10    Zachary Phase II: Zachary Score: 10    Vitals:    03/20/23 0945 03/20/23 1000 03/20/23 1014 03/20/23 1047   BP: 128/68 128/73 128/73 123/62   Pulse: 67 68 68    Resp: 20 16 18 16   Temp:   97.7 °F (36.5 °C)    TempSrc:   Temporal    SpO2: 96% 95% 96% 96%   Weight:       Height:         Anesthesia Post Evaluation    Patient location during evaluation: bedside  Patient participation: complete - patient participated  Level of consciousness: awake and alert  Airway patency: patent  Nausea & Vomiting: no nausea  Complications: no  Cardiovascular status: hemodynamically stable  Respiratory status: acceptable  Hydration status: euvolemic

## 2023-03-20 NOTE — DISCHARGE INSTRUCTIONS
[facility] AND [place]    Bryn Pereira. Argenis Earl M.D. [address] [facility] [address] [address] [address] [place]                [address] Alphonse Garrison M.D. [address] [facility], [address] [place] [address]         [place], [address] Alec Eller M.D                         [phone]          [facility] Sheldon Tony M.D. [address] [facility]                                                        POST-OPERATIVE INSTRUCTIONS HERNIA REPAIR    Call the office to schedule your post-operative appointment with your surgeon for two (2) weeks. If you still have bandages over your incisions, you  may remove them in 2-3 days. Place an ice pack over your incisions on and off (15min) at a time for the next 2 days. General guidelines for activity:      Avoid strenuous activity or lifting anything heavier than 15 pounds. It is OK to be up and walking around. Going up and down stairs is   also OK. Do what is comfortable: stop and rest when you feel tired. You will have pain medicine ordered. Take as directed. Do NOT drive while taking your narcotic pain medicine. You may resume driving when you feel capable of responding to urgent situation if needed and not taking prescription pain medication. Watch for signs of infection:    Excessive warmth or bright redness around your incisions    Leakage of bloody or cloudy fluid from you incisions    Fever over 100.5    If you experience constipation  Increase your water intake. Increase your activity; walking is best.  A stool softener or mild laxative may be necessary if you still have not had a bowel  movement ; call the office for further instructions.    ANESTHESIA DISCHARGE INSTRUCTIONS    You are under the influence of drugs- do not drink alcohol,

## 2023-03-20 NOTE — OP NOTE
[address]                 [address] [facility] [address]                                OPERATIVE REPORT    PATIENT NAME: Ashly Fernandez                  :        1990  MED REC NO:   [id number]                          ROOM:  ACCOUNT NO:   [de-identified]                           ADMIT DATE: 2023  PROVIDER:     Loretta Lord MD    DATE OF PROCEDURE:  2023    PREOPERATIVE DIAGNOSIS:  Incarcerated ventral hernia. POSTOPERATIVE DIAGNOSIS:  Incarcerated ventral hernia. OPERATION PERFORMED:  Laparoscopic repair of incarcerated ventral hernia  with implantation of 4-1/2-inch diameter Ventralight ST mesh. SURGEON:  Loretta Lord MD    ANESTHESIA:  General.    COMPLICATIONS:  None. ESTIMATED BLOOD LOSS:  Less than 50 mL. INDICATIONS FOR THE OPERATION:  A 36-year-old male with a bulge and pain  in the supraumbilical area. He was diagnosed with an incarcerated  ventral hernia. I recommended operative intervention. The risks and  benefits were explained. The patient understood them, accepted them and  elected to proceed. DESCRIPTION OF OPERATION:  The patient was brought to the operating  room. General anesthesia was induced. He was prepped and draped in  usual surgical sterile fashion. A 5 mm left subcostal incision was  made. Camera inside of a trocar was used to visualize the layers of the  abdominal wall as we went through them. Pneumoperitoneum was  established. A disposable 5 mm trocar was then placed through both  lower quadrants. A 12 mm trocar was placed in the right subcostal area. We had adequate visualization of the periumbilical area. I divided the  peritoneum where there was a defect. There was incarcerated  preperitoneal fat. We reduced and removed all the herniated contents  and incarcerated contents. The defect was small. It was only about 1.5  cm.   Once this was all cleared, a 4-1/2-inch diameter

## 2023-04-03 ENCOUNTER — OFFICE VISIT (OUTPATIENT)
Dept: SURGERY | Age: 33
End: 2023-04-03

## 2023-04-03 VITALS
SYSTOLIC BLOOD PRESSURE: 116 MMHG | HEIGHT: 74 IN | WEIGHT: 198 LBS | HEART RATE: 85 BPM | BODY MASS INDEX: 25.41 KG/M2 | DIASTOLIC BLOOD PRESSURE: 79 MMHG

## 2023-04-03 DIAGNOSIS — Z09 SURGICAL FOLLOW-UP CARE: Primary | ICD-10-CM

## 2023-04-03 PROCEDURE — 99024 POSTOP FOLLOW-UP VISIT: CPT | Performed by: SURGERY

## 2023-04-03 NOTE — PROGRESS NOTES
[facility] GENERAL SURGERY      S:   Patient presents s/p lap ventral hernia repair 2 weeks  ago. He reports improvement. O:   Comfortable         Incision sites healing well. A:   S/P above    P:   Follow up as needed.

## 2023-04-06 ENCOUNTER — TELEPHONE (OUTPATIENT)
Dept: SURGERY | Age: 33
End: 2023-04-06

## 2023-04-06 NOTE — TELEPHONE ENCOUNTER
PT called stating he needs his return to work note to say he can return back to work on the 17th, it states he may return back on the 10th    He will pick it up if it can be changed.

## (undated) DEVICE — ABDOMINAL BINDER: Brand: DEROYAL

## (undated) DEVICE — ELECTRODE PT RET AD L9FT HI MOIST COND ADH HYDRGEL CORDED

## (undated) DEVICE — SYRINGE MED 10ML LUERLOCK TIP W/O SFTY DISP

## (undated) DEVICE — CORD ES L10FT MPLR LAP

## (undated) DEVICE — DRAPE,ABDOMINAL,MAJOR,STERILE: Brand: MEDLINE

## (undated) DEVICE — CIRCUIT ANES L72IN 3L BACT AND VIR FLTR EL CONN SGL LIMB

## (undated) DEVICE — TROCAR: Brand: KII® SLEEVE

## (undated) DEVICE — TUBING INSUF ISO CONN DISP

## (undated) DEVICE — GOWN,AURORA,NONREINF,RAGLAN,XXL,STERILE: Brand: MEDLINE

## (undated) DEVICE — TROCAR: Brand: KII FIOS FIRST ENTRY

## (undated) DEVICE — GAUZE SPONGES,8 PLY: Brand: CURITY

## (undated) DEVICE — SUTURE VCRL SZ 4-0 L18IN ABSRB UD L19MM PS-2 3/8 CIR PRIM J496H

## (undated) DEVICE — 3M™ TEGADERM™ TRANSPARENT FILM DRESSING FRAME STYLE, 1624W, 2-3/8 IN X 2-3/4 IN (6 CM X 7 CM), 100/CT 4CT/CASE: Brand: 3M™ TEGADERM™

## (undated) DEVICE — GLOVE ORANGE PI 8 1/2   MSG9085

## (undated) DEVICE — BANDAGE,GAUZE,4.5"X4.1YD,STERILE,LF: Brand: MEDLINE

## (undated) DEVICE — YANKAUER,BULB TIP,W/O VENT,RIGID,STERILE: Brand: MEDLINE

## (undated) DEVICE — NEEDLE SPNL 22GA L3.5IN BLK HUB S STL REG WALL FIT STYL W/

## (undated) DEVICE — INTENDED FOR TISSUE SEPARATION, AND OTHER PROCEDURES THAT REQUIRE A SHARP SURGICAL BLADE TO PUNCTURE OR CUT.: Brand: BARD-PARKER ® DISPOSABLE SCALPELS

## (undated) DEVICE — MAJOR SET UP PK

## (undated) DEVICE — APPLICATOR MEDICATED 26 CC SOLUTION HI LT ORNG CHLORAPREP

## (undated) DEVICE — KIT,ANTI FOG,W/SPONGE & FLUID,SOFT PACK: Brand: MEDLINE

## (undated) DEVICE — SUTURE SZ 0 27IN 5/8 CIR UR-6  TAPER PT VIOLET ABSRB VICRYL J603H

## (undated) DEVICE — STANDARD HYPODERMIC NEEDLE,POLYPROPYLENE HUB: Brand: MONOJECT

## (undated) DEVICE — TUBING, SUCTION, 3/16" X 10', STRAIGHT: Brand: MEDLINE

## (undated) DEVICE — TROCAR: Brand: KII® OPTICAL ACCESS SYSTEM

## (undated) DEVICE — SOLUTION IV IRRIG 500ML 0.9% SODIUM CHL 2F7123